# Patient Record
Sex: MALE | Employment: OTHER | URBAN - NONMETROPOLITAN AREA
[De-identification: names, ages, dates, MRNs, and addresses within clinical notes are randomized per-mention and may not be internally consistent; named-entity substitution may affect disease eponyms.]

---

## 2017-12-27 ENCOUNTER — INITIAL CONSULT (OUTPATIENT)
Dept: NEUROLOGY | Age: 72
End: 2017-12-27
Payer: MEDICARE

## 2017-12-27 VITALS — HEART RATE: 70 BPM | DIASTOLIC BLOOD PRESSURE: 90 MMHG | SYSTOLIC BLOOD PRESSURE: 130 MMHG | HEIGHT: 72 IN

## 2017-12-27 DIAGNOSIS — I63.032 CEREBRAL INFARCTION DUE TO THROMBOSIS OF LEFT CAROTID ARTERY (HCC): ICD-10-CM

## 2017-12-27 DIAGNOSIS — Q27.30 AVM (ARTERIOVENOUS MALFORMATION): Primary | ICD-10-CM

## 2017-12-27 PROCEDURE — 4040F PNEUMOC VAC/ADMIN/RCVD: CPT | Performed by: PSYCHIATRY & NEUROLOGY

## 2017-12-27 PROCEDURE — G8598 ASA/ANTIPLAT THER USED: HCPCS | Performed by: PSYCHIATRY & NEUROLOGY

## 2017-12-27 PROCEDURE — 3017F COLORECTAL CA SCREEN DOC REV: CPT | Performed by: PSYCHIATRY & NEUROLOGY

## 2017-12-27 PROCEDURE — G8484 FLU IMMUNIZE NO ADMIN: HCPCS | Performed by: PSYCHIATRY & NEUROLOGY

## 2017-12-27 PROCEDURE — 99214 OFFICE O/P EST MOD 30 MIN: CPT | Performed by: PSYCHIATRY & NEUROLOGY

## 2017-12-27 PROCEDURE — 1036F TOBACCO NON-USER: CPT | Performed by: PSYCHIATRY & NEUROLOGY

## 2017-12-27 PROCEDURE — G8428 CUR MEDS NOT DOCUMENT: HCPCS | Performed by: PSYCHIATRY & NEUROLOGY

## 2017-12-27 PROCEDURE — 1123F ACP DISCUSS/DSCN MKR DOCD: CPT | Performed by: PSYCHIATRY & NEUROLOGY

## 2017-12-27 PROCEDURE — G8421 BMI NOT CALCULATED: HCPCS | Performed by: PSYCHIATRY & NEUROLOGY

## 2017-12-27 RX ORDER — ATORVASTATIN CALCIUM 40 MG/1
40 TABLET, FILM COATED ORAL DAILY
COMMUNITY

## 2017-12-27 RX ORDER — FLECAINIDE ACETATE 50 MG/1
25 TABLET ORAL 2 TIMES DAILY
COMMUNITY
End: 2018-01-22 | Stop reason: DRUGHIGH

## 2017-12-27 RX ORDER — CLOPIDOGREL BISULFATE 75 MG/1
75 TABLET ORAL DAILY
Status: ON HOLD | COMMUNITY
End: 2018-01-24 | Stop reason: HOSPADM

## 2017-12-28 ENCOUNTER — TELEPHONE (OUTPATIENT)
Dept: NEUROLOGY | Age: 72
End: 2017-12-28

## 2017-12-28 NOTE — TELEPHONE ENCOUNTER
Obdulia notified of patient's Cerebral Angiogram on 1/30/18. [facility], no PA needed.

## 2018-01-10 ENCOUNTER — TELEPHONE (OUTPATIENT)
Dept: NEUROLOGY | Age: 73
End: 2018-01-10

## 2018-01-22 RX ORDER — FLECAINIDE ACETATE 100 MG/1
100 TABLET ORAL
COMMUNITY

## 2018-01-24 ENCOUNTER — APPOINTMENT (OUTPATIENT)
Dept: CARDIAC CATH/INVASIVE PROCEDURES | Age: 73
End: 2018-01-24
Payer: MEDICARE

## 2018-01-24 ENCOUNTER — HOSPITAL ENCOUNTER (OUTPATIENT)
Dept: INPATIENT UNIT | Age: 73
Discharge: HOME | End: 2018-01-24
Attending: PSYCHIATRY & NEUROLOGY | Admitting: PSYCHIATRY & NEUROLOGY
Payer: MEDICARE

## 2018-01-24 VITALS
SYSTOLIC BLOOD PRESSURE: 120 MMHG | RESPIRATION RATE: 16 BRPM | TEMPERATURE: 98 F | DIASTOLIC BLOOD PRESSURE: 70 MMHG | HEART RATE: 56 BPM | OXYGEN SATURATION: 96 % | HEIGHT: 72 IN | BODY MASS INDEX: 26.41 KG/M2 | WEIGHT: 195 LBS

## 2018-01-24 LAB
ABO: NORMAL
ALBUMIN SERPL-MCNC: 4.1 G/DL (ref 3.5–5.1)
ALP BLD-CCNC: 105 U/L (ref 38–126)
ALT SERPL-CCNC: 19 U/L (ref 11–66)
ANION GAP SERPL CALCULATED.3IONS-SCNC: 7 MEQ/L (ref 8–16)
ANTIBODY SCREEN: NORMAL
AST SERPL-CCNC: 23 U/L (ref 5–40)
BILIRUB SERPL-MCNC: 0.6 MG/DL (ref 0.3–1.2)
BUN BLDV-MCNC: 20 MG/DL (ref 7–22)
CALCIUM SERPL-MCNC: 9.3 MG/DL (ref 8.5–10.5)
CHLORIDE BLD-SCNC: 99 MEQ/L (ref 98–111)
CO2: 33 MEQ/L (ref 23–33)
CREAT SERPL-MCNC: 0.9 MG/DL (ref 0.4–1.2)
GFR SERPL CREATININE-BSD FRML MDRD: 83 ML/MIN/1.73M2
GLUCOSE BLD-MCNC: 99 MG/DL (ref 70–108)
HCT VFR BLD CALC: 43.1 % (ref 42–52)
HEMOGLOBIN: 15.2 GM/DL (ref 14–18)
INR BLD: 1 (ref 0.85–1.13)
MCH RBC QN AUTO: 31.9 PG (ref 27–31)
MCHC RBC AUTO-ENTMCNC: 35.4 GM/DL (ref 33–37)
MCV RBC AUTO: 90.2 FL (ref 80–94)
PDW BLD-RTO: 12.9 % (ref 11.5–14.5)
PLATELET # BLD: 135 THOU/MM3 (ref 130–400)
PMV BLD AUTO: 10.8 MCM (ref 7.4–10.4)
POTASSIUM SERPL-SCNC: 4.6 MEQ/L (ref 3.5–5.2)
RBC # BLD: 4.78 MILL/MM3 (ref 4.7–6.1)
RH FACTOR: NORMAL
SODIUM BLD-SCNC: 139 MEQ/L (ref 135–145)
TOTAL PROTEIN: 7 G/DL (ref 6.1–8)
WBC # BLD: 5.5 THOU/MM3 (ref 4.8–10.8)

## 2018-01-24 PROCEDURE — 2500000003 HC RX 250 WO HCPCS

## 2018-01-24 PROCEDURE — 6360000002 HC RX W HCPCS

## 2018-01-24 PROCEDURE — C1894 INTRO/SHEATH, NON-LASER: HCPCS

## 2018-01-24 PROCEDURE — 36226 PLACE CATH VERTEBRAL ART: CPT

## 2018-01-24 PROCEDURE — 36224 PLACE CATH CAROTD ART: CPT | Performed by: PSYCHIATRY & NEUROLOGY

## 2018-01-24 PROCEDURE — 80053 COMPREHEN METABOLIC PANEL: CPT

## 2018-01-24 PROCEDURE — 85027 COMPLETE CBC AUTOMATED: CPT

## 2018-01-24 PROCEDURE — 96360 HYDRATION IV INFUSION INIT: CPT

## 2018-01-24 PROCEDURE — 86900 BLOOD TYPING SEROLOGIC ABO: CPT

## 2018-01-24 PROCEDURE — 96361 HYDRATE IV INFUSION ADD-ON: CPT

## 2018-01-24 PROCEDURE — 2580000003 HC RX 258: Performed by: PSYCHIATRY & NEUROLOGY

## 2018-01-24 PROCEDURE — 36415 COLL VENOUS BLD VENIPUNCTURE: CPT

## 2018-01-24 PROCEDURE — 36224 PLACE CATH CAROTD ART: CPT

## 2018-01-24 PROCEDURE — C1769 GUIDE WIRE: HCPCS

## 2018-01-24 PROCEDURE — A6258 TRANSPARENT FILM >16<=48 IN: HCPCS

## 2018-01-24 PROCEDURE — 86850 RBC ANTIBODY SCREEN: CPT

## 2018-01-24 PROCEDURE — 76377 3D RENDER W/INTRP POSTPROCES: CPT

## 2018-01-24 PROCEDURE — 86901 BLOOD TYPING SEROLOGIC RH(D): CPT

## 2018-01-24 PROCEDURE — 85610 PROTHROMBIN TIME: CPT

## 2018-01-24 PROCEDURE — 2720000010 HC SURG SUPPLY STERILE

## 2018-01-24 PROCEDURE — 36226 PLACE CATH VERTEBRAL ART: CPT | Performed by: PSYCHIATRY & NEUROLOGY

## 2018-01-24 RX ORDER — SODIUM CHLORIDE 9 MG/ML
INJECTION, SOLUTION INTRAVENOUS CONTINUOUS
Status: DISCONTINUED | OUTPATIENT
Start: 2018-01-24 | End: 2018-01-24 | Stop reason: HOSPADM

## 2018-01-24 RX ORDER — SODIUM CHLORIDE 9 MG/ML
INJECTION, SOLUTION INTRAVENOUS CONTINUOUS
Status: DISCONTINUED | OUTPATIENT
Start: 2018-01-24 | End: 2018-01-24 | Stop reason: ALTCHOICE

## 2018-01-24 RX ORDER — ACETAMINOPHEN 325 MG/1
650 TABLET ORAL EVERY 4 HOURS PRN
Status: DISCONTINUED | OUTPATIENT
Start: 2018-01-24 | End: 2018-01-24 | Stop reason: HOSPADM

## 2018-01-24 RX ORDER — HYDROCODONE BITARTRATE AND ACETAMINOPHEN 5; 325 MG/1; MG/1
1 TABLET ORAL EVERY 4 HOURS PRN
Status: DISCONTINUED | OUTPATIENT
Start: 2018-01-24 | End: 2018-01-24 | Stop reason: HOSPADM

## 2018-01-24 RX ORDER — ONDANSETRON 2 MG/ML
4 INJECTION INTRAMUSCULAR; INTRAVENOUS EVERY 8 HOURS PRN
Status: DISCONTINUED | OUTPATIENT
Start: 2018-01-24 | End: 2018-01-24 | Stop reason: HOSPADM

## 2018-01-24 RX ORDER — SODIUM CHLORIDE 0.9 % (FLUSH) 0.9 %
10 SYRINGE (ML) INJECTION PRN
Status: DISCONTINUED | OUTPATIENT
Start: 2018-01-24 | End: 2018-01-24 | Stop reason: HOSPADM

## 2018-01-24 RX ADMIN — SODIUM CHLORIDE: 9 INJECTION, SOLUTION INTRAVENOUS at 10:23

## 2018-01-24 NOTE — PLAN OF CARE
Problem: Discharge Planning:  Goal: Discharged to appropriate level of care  Discharged to appropriate level of care   2408 discharge instructions given to pt and spouse, both \"voice understanding\"    \"ready to go home\"     Problem: Tissue Perfusion - Peripheral, Altered:  Goal: Absence of hematoma at arterial access site  Absence of hematoma at arterial access site   1800 right femoral site looks good, no bleeding or hematoma

## 2018-01-26 ENCOUNTER — TELEPHONE (OUTPATIENT)
Dept: NEUROLOGY | Age: 73
End: 2018-01-26

## 2018-01-28 NOTE — PROCEDURES
abnormalities in any of the vessels visualized. The capillary and venous phases appear normal.    Left Vertebral Artery: All four segments (V1-V4) are visualized without evidence of any flow-limiting stenosis. The left PICA is seen originating from the V4 segment. The vertebral artery terminates into the basilar artery. There is no evidence of any flow-limiting stenosis of this artery. The basilar artery terminates into bilateral posterior cerebral arteries, again no flow-limiting stenosis is noted. There is no evidence of aneurysm or other vascular abnormalities in any of the vessels visualized. The capillary and venous phases appear normal.     Right Common Femoral Artery:  Arterial puncture site was along the mid point of the common femoral artery (CFA) at the level of the center of the femoral head. There is no evidence of arterial dissection or contrast extravasation. No evidence of flow-limiting stenosis. Summary of Findings:  Tortuous cluster of arteries in L M3/M4 distribution in region of interest on recent MRI and CTA that are not suggest of AVM or AVF.     Herb Rodriguez MD

## 2020-05-06 ENCOUNTER — NURSE ONLY (OUTPATIENT)
Dept: LAB | Age: 75
End: 2020-05-06